# Patient Record
Sex: MALE | Race: WHITE | Employment: FULL TIME | URBAN - METROPOLITAN AREA
[De-identification: names, ages, dates, MRNs, and addresses within clinical notes are randomized per-mention and may not be internally consistent; named-entity substitution may affect disease eponyms.]

---

## 2019-05-13 ENCOUNTER — HOSPITAL ENCOUNTER (INPATIENT)
Age: 49
LOS: 1 days | Discharge: HOME | DRG: 638 | End: 2019-05-14
Attending: EMERGENCY MEDICINE | Admitting: INTERNAL MEDICINE
Payer: COMMERCIAL

## 2019-05-13 ENCOUNTER — APPOINTMENT (OUTPATIENT)
Dept: GENERAL RADIOLOGY | Age: 49
DRG: 638 | End: 2019-05-13
Payer: COMMERCIAL

## 2019-05-13 ENCOUNTER — APPOINTMENT (OUTPATIENT)
Dept: CT IMAGING | Age: 49
DRG: 638 | End: 2019-05-13
Payer: COMMERCIAL

## 2019-05-13 DIAGNOSIS — R07.9 CHEST PAIN, UNSPECIFIED TYPE: ICD-10-CM

## 2019-05-13 DIAGNOSIS — E11.10 DIABETIC KETOACIDOSIS WITHOUT COMA ASSOCIATED WITH TYPE 2 DIABETES MELLITUS (HCC): Primary | ICD-10-CM

## 2019-05-13 DIAGNOSIS — R11.2 NON-INTRACTABLE VOMITING WITH NAUSEA, UNSPECIFIED VOMITING TYPE: ICD-10-CM

## 2019-05-13 DIAGNOSIS — E87.5 HYPERKALEMIA: ICD-10-CM

## 2019-05-13 DIAGNOSIS — R74.8 ELEVATED LIPASE: ICD-10-CM

## 2019-05-13 DIAGNOSIS — N28.9 RENAL INSUFFICIENCY: ICD-10-CM

## 2019-05-13 PROBLEM — E10.10 DKA, TYPE 1, NOT AT GOAL (HCC): Status: ACTIVE | Noted: 2019-05-13

## 2019-05-13 LAB
ALBUMIN SERPL-MCNC: 4.6 GM/DL (ref 3.4–5)
ALP BLD-CCNC: 70 IU/L (ref 40–128)
ALT SERPL-CCNC: 28 U/L (ref 10–40)
ANION GAP SERPL CALCULATED.3IONS-SCNC: 19 MMOL/L (ref 4–16)
ANION GAP SERPL CALCULATED.3IONS-SCNC: 26 MMOL/L (ref 4–16)
ANION GAP SERPL CALCULATED.3IONS-SCNC: 37 MMOL/L (ref 4–16)
AST SERPL-CCNC: 13 IU/L (ref 15–37)
BASE EXCESS: ABNORMAL (ref 0–3.3)
BASOPHILS ABSOLUTE: 0.1 K/CU MM
BASOPHILS RELATIVE PERCENT: 0.5 % (ref 0–1)
BETA-HYDROXYBUTYRATE: >20.8 MG/DL (ref 0–3)
BILIRUB SERPL-MCNC: 0.4 MG/DL (ref 0–1)
BUN BLDV-MCNC: 26 MG/DL (ref 6–23)
BUN BLDV-MCNC: 27 MG/DL (ref 6–23)
BUN BLDV-MCNC: 29 MG/DL (ref 6–23)
CALCIUM SERPL-MCNC: 8.5 MG/DL (ref 8.3–10.6)
CALCIUM SERPL-MCNC: 8.6 MG/DL (ref 8.3–10.6)
CALCIUM SERPL-MCNC: 8.9 MG/DL (ref 8.3–10.6)
CHLORIDE BLD-SCNC: 102 MMOL/L (ref 99–110)
CHLORIDE BLD-SCNC: 90 MMOL/L (ref 99–110)
CHLORIDE BLD-SCNC: 99 MMOL/L (ref 99–110)
CHP ED QC CHECK: NORMAL
CO2: 11 MMOL/L (ref 21–32)
CO2: 15 MMOL/L (ref 21–32)
CO2: 4 MMOL/L (ref 21–32)
COMMENT: ABNORMAL
CREAT SERPL-MCNC: 1.3 MG/DL (ref 0.9–1.3)
CREAT SERPL-MCNC: 1.5 MG/DL (ref 0.9–1.3)
CREAT SERPL-MCNC: 1.6 MG/DL (ref 0.9–1.3)
DIFFERENTIAL TYPE: ABNORMAL
EOSINOPHILS ABSOLUTE: 0 K/CU MM
EOSINOPHILS RELATIVE PERCENT: 0 % (ref 0–3)
GFR AFRICAN AMERICAN: 56 ML/MIN/1.73M2
GFR AFRICAN AMERICAN: >60 ML/MIN/1.73M2
GFR AFRICAN AMERICAN: >60 ML/MIN/1.73M2
GFR NON-AFRICAN AMERICAN: 46 ML/MIN/1.73M2
GFR NON-AFRICAN AMERICAN: 50 ML/MIN/1.73M2
GFR NON-AFRICAN AMERICAN: 59 ML/MIN/1.73M2
GLUCOSE BLD-MCNC: 162 MG/DL (ref 70–99)
GLUCOSE BLD-MCNC: 218 MG/DL (ref 70–99)
GLUCOSE BLD-MCNC: 220 MG/DL (ref 70–99)
GLUCOSE BLD-MCNC: 234 MG/DL (ref 70–99)
GLUCOSE BLD-MCNC: 262 MG/DL (ref 70–99)
GLUCOSE BLD-MCNC: 296 MG/DL (ref 70–99)
GLUCOSE BLD-MCNC: 329 MG/DL (ref 70–99)
GLUCOSE BLD-MCNC: 350 MG/DL (ref 70–99)
GLUCOSE BLD-MCNC: 380 MG/DL (ref 70–99)
GLUCOSE BLD-MCNC: 471 MG/DL
GLUCOSE BLD-MCNC: 471 MG/DL (ref 70–99)
GLUCOSE BLD-MCNC: 521 MG/DL (ref 70–99)
GLUCOSE BLD-MCNC: 533 MG/DL (ref 70–99)
HCO3 VENOUS: 3.7 MMOL/L (ref 19–25)
HCT VFR BLD CALC: 51 % (ref 42–52)
HEMOGLOBIN: 15.5 GM/DL (ref 13.5–18)
IMMATURE NEUTROPHIL %: 1.1 % (ref 0–0.43)
LACTATE: 1.5 MMOL/L (ref 0.4–2)
LACTATE: ABNORMAL MMOL/L (ref 0.4–2)
LIPASE: 170 IU/L (ref 13–60)
LYMPHOCYTES ABSOLUTE: 1 K/CU MM
LYMPHOCYTES RELATIVE PERCENT: 7 % (ref 24–44)
MAGNESIUM: 2.4 MG/DL (ref 1.8–2.4)
MAGNESIUM: 2.5 MG/DL (ref 1.8–2.4)
MCH RBC QN AUTO: 29.9 PG (ref 27–31)
MCHC RBC AUTO-ENTMCNC: 30.4 % (ref 32–36)
MCV RBC AUTO: 98.5 FL (ref 78–100)
MONOCYTES ABSOLUTE: 0.9 K/CU MM
MONOCYTES RELATIVE PERCENT: 6.4 % (ref 0–4)
NUCLEATED RBC %: 0 %
O2 SAT, VEN: 94.6 % (ref 50–70)
PCO2, VEN: 17 MMHG (ref 38–52)
PDW BLD-RTO: 12.8 % (ref 11.7–14.9)
PH VENOUS: 6.95 (ref 7.32–7.42)
PHOSPHORUS: 1.6 MG/DL (ref 2.5–4.9)
PHOSPHORUS: 2.9 MG/DL (ref 2.5–4.9)
PLATELET # BLD: 268 K/CU MM (ref 140–440)
PMV BLD AUTO: 9.6 FL (ref 7.5–11.1)
PO2, VEN: 149 MMHG (ref 28–48)
POTASSIUM SERPL-SCNC: 4.2 MMOL/L (ref 3.5–5.1)
POTASSIUM SERPL-SCNC: 4.5 MMOL/L (ref 3.5–5.1)
POTASSIUM SERPL-SCNC: ABNORMAL MMOL/L (ref 3.5–5.1)
RBC # BLD: 5.18 M/CU MM (ref 4.6–6.2)
SEGMENTED NEUTROPHILS ABSOLUTE COUNT: 12.4 K/CU MM
SEGMENTED NEUTROPHILS RELATIVE PERCENT: 85 % (ref 36–66)
SODIUM BLD-SCNC: 131 MMOL/L (ref 135–145)
SODIUM BLD-SCNC: 136 MMOL/L (ref 135–145)
SODIUM BLD-SCNC: 136 MMOL/L (ref 135–145)
TOTAL IMMATURE NEUTOROPHIL: 0.16 K/CU MM
TOTAL NUCLEATED RBC: 0 K/CU MM
TOTAL PROTEIN: 7.4 GM/DL (ref 6.4–8.2)
TROPONIN T: <0.01 NG/ML
WBC # BLD: 14.6 K/CU MM (ref 4–10.5)

## 2019-05-13 PROCEDURE — 71045 X-RAY EXAM CHEST 1 VIEW: CPT

## 2019-05-13 PROCEDURE — 6360000002 HC RX W HCPCS: Performed by: EMERGENCY MEDICINE

## 2019-05-13 PROCEDURE — 96375 TX/PRO/DX INJ NEW DRUG ADDON: CPT

## 2019-05-13 PROCEDURE — 6370000000 HC RX 637 (ALT 250 FOR IP): Performed by: NURSE PRACTITIONER

## 2019-05-13 PROCEDURE — 36415 COLL VENOUS BLD VENIPUNCTURE: CPT

## 2019-05-13 PROCEDURE — 96366 THER/PROPH/DIAG IV INF ADDON: CPT

## 2019-05-13 PROCEDURE — 96365 THER/PROPH/DIAG IV INF INIT: CPT

## 2019-05-13 PROCEDURE — C9113 INJ PANTOPRAZOLE SODIUM, VIA: HCPCS | Performed by: EMERGENCY MEDICINE

## 2019-05-13 PROCEDURE — 2580000003 HC RX 258: Performed by: NURSE PRACTITIONER

## 2019-05-13 PROCEDURE — 6370000000 HC RX 637 (ALT 250 FOR IP): Performed by: INTERNAL MEDICINE

## 2019-05-13 PROCEDURE — 6370000000 HC RX 637 (ALT 250 FOR IP): Performed by: EMERGENCY MEDICINE

## 2019-05-13 PROCEDURE — 93005 ELECTROCARDIOGRAM TRACING: CPT | Performed by: EMERGENCY MEDICINE

## 2019-05-13 PROCEDURE — 82962 GLUCOSE BLOOD TEST: CPT

## 2019-05-13 PROCEDURE — 6360000004 HC RX CONTRAST MEDICATION: Performed by: EMERGENCY MEDICINE

## 2019-05-13 PROCEDURE — 96368 THER/DIAG CONCURRENT INF: CPT

## 2019-05-13 PROCEDURE — 80053 COMPREHEN METABOLIC PANEL: CPT

## 2019-05-13 PROCEDURE — 80048 BASIC METABOLIC PNL TOTAL CA: CPT

## 2019-05-13 PROCEDURE — 84100 ASSAY OF PHOSPHORUS: CPT

## 2019-05-13 PROCEDURE — 83605 ASSAY OF LACTIC ACID: CPT

## 2019-05-13 PROCEDURE — 2000000000 HC ICU R&B

## 2019-05-13 PROCEDURE — 84484 ASSAY OF TROPONIN QUANT: CPT

## 2019-05-13 PROCEDURE — 83690 ASSAY OF LIPASE: CPT

## 2019-05-13 PROCEDURE — 93010 ELECTROCARDIOGRAM REPORT: CPT | Performed by: INTERNAL MEDICINE

## 2019-05-13 PROCEDURE — 85025 COMPLETE CBC W/AUTO DIFF WBC: CPT

## 2019-05-13 PROCEDURE — 82805 BLOOD GASES W/O2 SATURATION: CPT

## 2019-05-13 PROCEDURE — 2500000003 HC RX 250 WO HCPCS: Performed by: NURSE PRACTITIONER

## 2019-05-13 PROCEDURE — 2580000003 HC RX 258: Performed by: EMERGENCY MEDICINE

## 2019-05-13 PROCEDURE — 83735 ASSAY OF MAGNESIUM: CPT

## 2019-05-13 PROCEDURE — 94761 N-INVAS EAR/PLS OXIMETRY MLT: CPT

## 2019-05-13 PROCEDURE — 74177 CT ABD & PELVIS W/CONTRAST: CPT

## 2019-05-13 PROCEDURE — 2500000003 HC RX 250 WO HCPCS: Performed by: EMERGENCY MEDICINE

## 2019-05-13 PROCEDURE — 99291 CRITICAL CARE FIRST HOUR: CPT

## 2019-05-13 PROCEDURE — 82010 KETONE BODYS QUAN: CPT

## 2019-05-13 RX ORDER — 0.9 % SODIUM CHLORIDE 0.9 %
1000 INTRAVENOUS SOLUTION INTRAVENOUS ONCE
Status: COMPLETED | OUTPATIENT
Start: 2019-05-13 | End: 2019-05-13

## 2019-05-13 RX ORDER — DEXTROSE MONOHYDRATE 25 G/50ML
12.5 INJECTION, SOLUTION INTRAVENOUS PRN
Status: DISCONTINUED | OUTPATIENT
Start: 2019-05-13 | End: 2019-05-14 | Stop reason: HOSPADM

## 2019-05-13 RX ORDER — POTASSIUM CHLORIDE 7.45 MG/ML
10 INJECTION INTRAVENOUS PRN
Status: DISCONTINUED | OUTPATIENT
Start: 2019-05-13 | End: 2019-05-14 | Stop reason: HOSPADM

## 2019-05-13 RX ORDER — SODIUM CHLORIDE 450 MG/100ML
INJECTION, SOLUTION INTRAVENOUS CONTINUOUS
Status: DISCONTINUED | OUTPATIENT
Start: 2019-05-13 | End: 2019-05-14

## 2019-05-13 RX ORDER — DEXTROSE MONOHYDRATE 50 MG/ML
100 INJECTION, SOLUTION INTRAVENOUS PRN
Status: DISCONTINUED | OUTPATIENT
Start: 2019-05-13 | End: 2019-05-14 | Stop reason: HOSPADM

## 2019-05-13 RX ORDER — INSULIN GLARGINE 100 [IU]/ML
40 INJECTION, SOLUTION SUBCUTANEOUS NIGHTLY
Status: DISCONTINUED | OUTPATIENT
Start: 2019-05-13 | End: 2019-05-14 | Stop reason: HOSPADM

## 2019-05-13 RX ORDER — 0.9 % SODIUM CHLORIDE 0.9 %
10 VIAL (ML) INJECTION
Status: DISCONTINUED | OUTPATIENT
Start: 2019-05-13 | End: 2019-05-14 | Stop reason: HOSPADM

## 2019-05-13 RX ORDER — LISINOPRIL 5 MG/1
5 TABLET ORAL DAILY
COMMUNITY

## 2019-05-13 RX ORDER — NICOTINE POLACRILEX 4 MG
15 LOZENGE BUCCAL PRN
Status: DISCONTINUED | OUTPATIENT
Start: 2019-05-13 | End: 2019-05-14 | Stop reason: HOSPADM

## 2019-05-13 RX ORDER — PANTOPRAZOLE SODIUM 40 MG/10ML
40 INJECTION, POWDER, LYOPHILIZED, FOR SOLUTION INTRAVENOUS ONCE
Status: COMPLETED | OUTPATIENT
Start: 2019-05-13 | End: 2019-05-13

## 2019-05-13 RX ORDER — SIMVASTATIN 20 MG
20 TABLET ORAL NIGHTLY
Status: DISCONTINUED | OUTPATIENT
Start: 2019-05-13 | End: 2019-05-14 | Stop reason: HOSPADM

## 2019-05-13 RX ORDER — MAGNESIUM SULFATE 1 G/100ML
1 INJECTION INTRAVENOUS PRN
Status: DISCONTINUED | OUTPATIENT
Start: 2019-05-13 | End: 2019-05-14 | Stop reason: HOSPADM

## 2019-05-13 RX ORDER — ONDANSETRON 2 MG/ML
4 INJECTION INTRAMUSCULAR; INTRAVENOUS EVERY 30 MIN PRN
Status: DISCONTINUED | OUTPATIENT
Start: 2019-05-13 | End: 2019-05-14 | Stop reason: HOSPADM

## 2019-05-13 RX ORDER — SIMVASTATIN 20 MG
20 TABLET ORAL NIGHTLY
COMMUNITY

## 2019-05-13 RX ORDER — ACETAMINOPHEN 325 MG/1
650 TABLET ORAL EVERY 4 HOURS PRN
Status: DISCONTINUED | OUTPATIENT
Start: 2019-05-13 | End: 2019-05-14 | Stop reason: HOSPADM

## 2019-05-13 RX ORDER — DEXTROSE AND SODIUM CHLORIDE 5; .45 G/100ML; G/100ML
INJECTION, SOLUTION INTRAVENOUS CONTINUOUS PRN
Status: DISCONTINUED | OUTPATIENT
Start: 2019-05-13 | End: 2019-05-14 | Stop reason: HOSPADM

## 2019-05-13 RX ADMIN — IOPAMIDOL 75 ML: 755 INJECTION, SOLUTION INTRAVENOUS at 12:26

## 2019-05-13 RX ADMIN — Medication 100 MEQ: at 11:12

## 2019-05-13 RX ADMIN — SODIUM CHLORIDE 1000 ML: 9 INJECTION, SOLUTION INTRAVENOUS at 11:08

## 2019-05-13 RX ADMIN — CALCIUM GLUCONATE 1 G: 98 INJECTION, SOLUTION INTRAVENOUS at 13:02

## 2019-05-13 RX ADMIN — ONDANSETRON 4 MG: 2 INJECTION INTRAMUSCULAR; INTRAVENOUS at 11:13

## 2019-05-13 RX ADMIN — SIMVASTATIN 20 MG: 20 TABLET, FILM COATED ORAL at 20:43

## 2019-05-13 RX ADMIN — PANTOPRAZOLE SODIUM 40 MG: 40 INJECTION, POWDER, FOR SOLUTION INTRAVENOUS at 11:13

## 2019-05-13 RX ADMIN — SODIUM CHLORIDE 1000 ML: 9 INJECTION, SOLUTION INTRAVENOUS at 11:09

## 2019-05-13 RX ADMIN — INSULIN LISPRO 10 UNITS: 100 INJECTION, SOLUTION INTRAVENOUS; SUBCUTANEOUS at 23:06

## 2019-05-13 RX ADMIN — SODIUM CHLORIDE 0.1 UNITS/KG/HR: 9 INJECTION, SOLUTION INTRAVENOUS at 12:58

## 2019-05-13 RX ADMIN — ACETAMINOPHEN 650 MG: 325 TABLET ORAL at 16:05

## 2019-05-13 RX ADMIN — INSULIN LISPRO 4 UNITS: 100 INJECTION, SOLUTION INTRAVENOUS; SUBCUTANEOUS at 23:03

## 2019-05-13 RX ADMIN — DEXTROSE AND SODIUM CHLORIDE: 5; 450 INJECTION, SOLUTION INTRAVENOUS at 20:37

## 2019-05-13 RX ADMIN — FAMOTIDINE 20 MG: 10 INJECTION, SOLUTION INTRAVENOUS at 20:43

## 2019-05-13 RX ADMIN — SODIUM CHLORIDE 5.2 UNITS/HR: 9 INJECTION, SOLUTION INTRAVENOUS at 16:05

## 2019-05-13 RX ADMIN — SODIUM CHLORIDE: 4.5 INJECTION, SOLUTION INTRAVENOUS at 16:05

## 2019-05-13 ASSESSMENT — PAIN SCALES - GENERAL
PAINLEVEL_OUTOF10: 9
PAINLEVEL_OUTOF10: 0
PAINLEVEL_OUTOF10: 0
PAINLEVEL_OUTOF10: 6
PAINLEVEL_OUTOF10: 9

## 2019-05-13 ASSESSMENT — PAIN DESCRIPTION - DIRECTION: RADIATING_TOWARDS: BACK

## 2019-05-13 ASSESSMENT — PAIN DESCRIPTION - PAIN TYPE
TYPE: ACUTE PAIN
TYPE: ACUTE PAIN

## 2019-05-13 ASSESSMENT — PAIN DESCRIPTION - LOCATION
LOCATION: HEAD
LOCATION: CHEST

## 2019-05-13 ASSESSMENT — PAIN DESCRIPTION - FREQUENCY: FREQUENCY: CONTINUOUS

## 2019-05-13 ASSESSMENT — PAIN DESCRIPTION - ORIENTATION: ORIENTATION: MID

## 2019-05-13 ASSESSMENT — PAIN DESCRIPTION - DESCRIPTORS
DESCRIPTORS: SHARP
DESCRIPTORS: HEADACHE

## 2019-05-13 NOTE — ED NOTES
[place],lactic acid and venous ph was drawn on patient  Second set of blood cultures were drawn on patient     Adilene ACOSTA  Myrtle  05/13/19 5850

## 2019-05-13 NOTE — FLOWSHEET NOTE
Pt arrived to ICU via cart per RN. Pt alert and oriented. No s/s distress noted. Skin check performed, see flowsheets. Insulin gtt infusing, see eMar. VSS. RN to continue to monitor closely.

## 2019-05-13 NOTE — PROGRESS NOTES
Medication History  [facility]    Patient Name: Savi Higgins 1970     Medication history has been completed by: Pro Witt CPhT    Source(s) of information: patient and insurance claims     Primary Care Physician: No primary care provider on file. Pharmacy: [place], [place]    Allergies as of 05/13/2019    (No Known Allergies)        Prior to Admission medications    Medication Sig Start Date End Date Taking? Authorizing Provider   lisinopril (PRINIVIL;ZESTRIL) 5 MG tablet Take 5 mg by mouth daily    Yes Historical Provider, MD   simvastatin (ZOCOR) 20 MG tablet Take 20 mg by mouth nightly    Yes Historical Provider, MD   INSULIN ASPART SC Inject into the skin Indications: insulin pump Use for insulin pump   Yes Historical Provider, MD   Insulin Pump - insulin aspart (patient supplied) Inject into the skin continuous Patient uses Omnipo Insulin Pump System   Yes Historical Provider, MD   Specialty Vitamins Products (TIANA-RX DIABETIC VITAMIN PO) Take by mouth daily   Yes Historical Provider, MD     Medications added or changed (ex.  new medication, dosage change, interval change, formulation change):  Diabetic vitamin pack daily (added)  Lisinopril dosage change from 10 mg to 5 mg daily     Other Comments:  Medication list reviewed with patient and insurance claims verified  Patient states he uses Omnipo insulin pump reports he uses Novolog     To my knowledge the above medication history is accurate as of 5/13/2019 1:47 PM.   Pro Witt CPhT   5/13/2019 1:47 PM

## 2019-05-13 NOTE — H&P
History and Physical      Name:  Ramón Ortega /Age/Sex: 1970  (51 y.o. male)   MRN & CSN:  [id number] & [id number] Admission Date/Time: 2019 10:11 AM   Location:  ED16/ED-16 PCP: No primary care provider on file. Hospital Day: 1    Discussed patient and POC with Dr. Kristen Arriaga and Plan:     Ramón Ortega is a 51 y.o.  male  who presents with abdominal pain, emesis, onset yesterday. - Abdominal pain, with emesis  Likely 2/2 below  CT ab/pelv without acute process  Emesis resolved at time of exam  Zofran    - DKA  Corrected Na is 137  Glucose 533, Beta Hydroxy > 20, Lactate 1.6, bicarb 4, AG 37  2 L NS bolus in ED  Insulin gtt  Maintenance IVF 0.45, then d5 45  Consult to Endo  BMP q 4 with Mg and phos  Will recheck lactic x 1  Patient received 2 amp bicarb in ED; no further bicarb at this time    - Hyperkalemia  5.8 on admission   Received Calcium gluconate in ED  Should correct with IVF; recheck    - ELIE  Creat 1.6  2/2 dehydration from above plus emesis  IVF as above  Recheck  Holding ACEi; holding nephro consult at this time    - HTN  Controlled  Holding ACEi overnight        Discussed patient with ED physician    Diet NPO   DVT Prophylaxis [x] Lovenox, []  Heparin, [] SCDs, [] Ambulation   GI Prophylaxis [] PPI,  [x] H2 Blocker,  [] Carafate,  [] Diet/Tube Feeds   Code Status Full   Disposition Patient requires continued admission due to DKA management   MDM [] Low, [] Moderate,[x]  High  Patient's risk as above due to      [x] One or more chronic illnesses with exacerbation progression      [x] Two or more stable chronic illnesses      [] Undiagnosed new problem with uncertain prognosis      [] Elective major surgery      []Prescription drug management     History of Present Illness:     Chief Complaint: abdominal pain, emesis    Ramón Ortega is a 51 y.o.  male  who presents from home with abdominal pain and emesis, onset yesterday.   Patient reports having DM I, for which he controls with an insulin pump. States he is from [place] and has been in [place] for a few days, taking care of his father and ran out of insulin. States he takes injectable insulin with him, but \"didn't think about it\" and thought he could control with diet. Reports having abdominal pain and heartburn and several bouts of emesis yesterday and this morning. Denies chest pain/pressure, sob, diarrhea. Has not had emesis in several hours. Abdominal pain 3/10, dull, diffuse, nonradiating. Ten point ROS reviewed negative, unless as noted above    Objective: Intake/Output Summary (Last 24 hours) at 5/13/2019 1448  Last data filed at 5/13/2019 1410  Gross per 24 hour   Intake 1100 ml   Output --   Net 1100 ml      Vitals:   Vitals:    05/13/19 1333   BP: (!) 147/76   Pulse: 119   Resp: 24   Temp: 98.3   SpO2: 100%     Physical Exam:   GEN Awake male, sitting upright in bed in no apparent distress. Appears given age. EYES Pupils are 3mm and PERRLA bilat. EOMs intact. No scleral erythema, discharge, or conjunctivitis. HENT Mucous membranes are moist. Oral pharynx without exudates, no evidence of thrush. NECK Supple, no apparent thyromegaly or masses. RESP Clear to auscultation, no wheezes, rales or rhonchi. Symmetric chest movement while on room air. CARDIO/VASC S1/S2 auscultated. Regular rate without appreciable murmurs, rubs, or gallops. No JVD or carotid bruits. Peripheral pulses equal bilaterally and palpable. No peripheral edema. GI Abdomen is soft without significant tenderness, masses, or guarding. Bowel sounds are normoactive. Rectal exam deferred.  No costovertebral angle tenderness. Collazo catheter is not present. HEME/LYMPH No palpable cervical lymphadenopathy and no hepatosplenomegaly. No petechiae or ecchymoses. MSK No gross joint deformities. 5/5 Strength bilaterally to delt/biceps/triceps/grasp/finger intrinsics/hip/knee/pf/df  SKIN Normal coloration, warm, dry.   NEURO Cranial nerves appear grossly intact, normal speech, no lateralizing weakness. Sensation intact and equal bilaterally  PSYCH Awake, alert, oriented x 4. Affect appropriate. Past Medical History:      Past Medical History:   Diagnosis Date    Diabetes mellitus ([facility] [address]     History of intestinal surgery 2015    Hyperlipidemia     Hypertension      PSHX:  has a past surgical history that includes Small intestine surgery (2015). Allergies: No Known Allergies    FAM HX: Mother, father alive. No known problems. Soc HX:   Social History     Socioeconomic History    Marital status:      Spouse name: None    Number of children: None    Years of education: None    Highest education level: None   Occupational History    None   Social Needs    Financial resource strain: None    Food insecurity:     Worry: None     Inability: None    Transportation needs:     Medical: None     Non-medical: None   Tobacco Use    Smoking status: Never Smoker    Smokeless tobacco: Never Used   Substance and Sexual Activity    Alcohol use:  Yes     Alcohol/week: 7.2 oz     Types: 12 Cans of beer per week    Drug use: Never    Sexual activity: None   Lifestyle    Physical activity:     Days per week: None     Minutes per session: None    Stress: None   Relationships    Social connections:     Talks on phone: None     Gets together: None     Attends Judaism service: None     Active member of club or organization: None     Attends meetings of clubs or organizations: None     Relationship status: None    Intimate partner violence:     Fear of current or ex partner: None     Emotionally abused: None     Physically abused: None     Forced sexual activity: None   Other Topics Concern    None   Social History Narrative    None       Medications:   Medications:      lisinopril (PRINIVIL;ZESTRIL) 5 MG tablet Take 5 mg by mouth daily  MICHAELA Rollins NP Not Ordered   simvastatin (ZOCOR) 20 MG tablet Take 20 mg by mouth colectomy. Pelvis: No pelvic masses or fluid collections are seen. Peritoneum/Retroperitoneum: The abdominal aorta is not aneurysmal.  No  retroperitoneal or mesenteric lymphadenopathy is seen. Bones/Soft Tissues: No acute bony abnormalities are noted. Shaheed Patel is a non  aggressive appearing lesion involving the left iliac wing.     Impression:       1. No acute intra-abdominal abnormality. 2. No acute intrapelvic abnormality. 3. Hepatic steatosis.     XR CHEST PORTABLE [[id number]] Collected: 05/13/19 1053     Order Status: Completed Updated: 05/13/19 1056     Narrative:       EXAMINATION:  ONE XRAY VIEW OF THE CHEST    5/13/2019 10:19 am    COMPARISON:  None. HISTORY:  ORDERING SYSTEM PROVIDED HISTORY: chest pain  TECHNOLOGIST PROVIDED HISTORY:  Reason for exam:->chest pain  Ordering Physician Provided Reason for Exam: chest pain   vomiting x2 days  Acuity: Acute  Type of Exam: Initial    FINDINGS:  Heart size and pulmonary vasculature within normal limits.  Lungs clear.   Costophrenic angles sharp     Impression:       No active cardiopulmonary disease           Electronically signed by MICHAELA Winters NP on 5/13/2019 at 2:48 PM

## 2019-05-13 NOTE — ED PROVIDER NOTES
3.5 - 5.1 MMOL/L     5.8  K & GLU CALLED TO DR Stacey Martinez ON 05/13/2019 @ 1049 BY JOANNE S   RESULTS READ BACK      Chloride 90 (L) 99 - 110 mMol/L    CO2 4 (L) 21 - 32 MMOL/L    BUN 26 (H) 6 - 23 MG/DL    CREATININE 1.6 (H) 0.9 - 1.3 MG/DL    Glucose 533 (HH) 70 - 99 MG/DL    Calcium 8.9 8.3 - 10.6 MG/DL    Alb 4.6 3.4 - 5.0 GM/DL    Total Protein 7.4 6.4 - 8.2 GM/DL    Total Bilirubin 0.4 0.0 - 1.0 MG/DL    ALT 28 10 - 40 U/L    AST 13 (L) 15 - 37 IU/L    Alkaline Phosphatase 70 40 - 128 IU/L    GFR Non- 46 (L) >60 mL/min/1.73m2    GFR  56 (L) >60 mL/min/1.73m2    Anion Gap 37 (H) 4 - 16   Troponin   Result Value Ref Range    Troponin T <0.010 <0.01 NG/ML   Lipase   Result Value Ref Range    Lipase 170 (H) 13 - 60 IU/L   Beta-Hydroxybutyrate   Result Value Ref Range    Beta-Hydroxybutyrate >20.8 (H) 0.0 - 3.0 MG/DL   Blood Gas, Venous   Result Value Ref Range    pH, Daniel 6.95 (L) 7.32 - 7.42    pCO2, Daniel 17 (L) 38 - 52 mmHG    pO2, Daniel 149 (H) 28 - 48 mmHG    Base Excess 27  MINUS   (H) 0 - 3.3    HCO3, Venous 3.7 (L) 19 - 25 MMOL/L    O2 Sat, Daniel 94.6 (H) 50 - 70 %    Comment VBG    Lactic Acid, Plasma   Result Value Ref Range    Lactate (HH) 0.4 - 2.0 mMOL/L     2.1  LACT CALLED TO DR Stacey Martinez ON 05/13/2019 @ 1049 BY JOANNE HEATH   RESULTS READ BACK     Lactic Acid, Plasma   Result Value Ref Range    Lactate 1.5 0.4 - 2.0 mMOL/L   POCT Glucose   Result Value Ref Range    POC Glucose 521 (H) 70 - 99 MG/DL   EKG 12 Lead   Result Value Ref Range    Ventricular Rate 98 BPM    Atrial Rate 98 BPM    P-R Interval 144 ms    QRS Duration 78 ms    Q-T Interval 346 ms    QTc Calculation (Bazett) 441 ms    P Axis 61 degrees    R Axis 15 degrees    T Axis 46 degrees    Diagnosis       Normal sinus rhythm  Normal ECG  No previous ECGs available  Confirmed by [facility] HE ([id number]) on 5/13/2019 10:57:25 AM          Radiographs:  [] Radiologist's Wet Read Report Reviewed:      CT ABDOMEN PELVIS W IV CONTRAST (Final result)   Result time 05/13/19 12:38:04   Final result by Alexander Doll MD (05/13/19 12:38:04)                Impression:    1. No acute intra-abdominal abnormality. 2. No acute intrapelvic abnormality. 3. Hepatic steatosis. Narrative:    EXAMINATION:  CT OF THE ABDOMEN AND PELVIS WITH CONTRAST 5/13/2019 12:24 pm    TECHNIQUE:  CT of the abdomen and pelvis was performed with the administration of  intravenous contrast. Multiplanar reformatted images are provided for review. Dose modulation, iterative reconstruction, and/or weight based adjustment of  the mA/kV was utilized to reduce the radiation dose to as low as reasonably  achievable. COMPARISON:  None. HISTORY:  ORDERING SYSTEM PROVIDED HISTORY: ABDOMINAL PAIN  TECHNOLOGIST PROVIDED HISTORY:  IV contrast only. Thank you. Ordering Physician Provided Reason for Exam: epigastric pain/ nausea x2 days  Acuity: Acute  Type of Exam: Initial  Additional signs and symptoms: 75ml isovue 370  Relevant Medical/Surgical History: hx small intestine surg/ twisted bowel    FINDINGS:  Lower Chest: The lung bases are clear. Organs: There is diffuse low-attenuation of the liver.  The spleen,  gallbladder, pancreas and adrenal glands appear unremarkable. Orelia Camron is  symmetric enhancement of the kidneys.  No hydronephrosis is seen. GI/Bowel: Evaluation of the bowel is limited as no enteric contrast was  given.  No dilated loops of bowel are seen. Orelia Camron are postsurgical changes  of partial right colectomy. Pelvis: No pelvic masses or fluid collections are seen. Peritoneum/Retroperitoneum: The abdominal aorta is not aneurysmal.  No  retroperitoneal or mesenteric lymphadenopathy is seen.     Bones/Soft Tissues: No acute bony abnormalities are noted. Orelia Camron is a non  aggressive appearing lesion involving the left iliac wing.                    XR CHEST PORTABLE (Final result)   Result time 05/13/19 10:53:53   Final result by Holland Hernandez Polo Luther MD (05/13/19 10:53:53)                Impression:    No active cardiopulmonary disease            Narrative:    EXAMINATION:  ONE XRAY VIEW OF THE CHEST    5/13/2019 10:19 am    COMPARISON:  None. HISTORY:  ORDERING SYSTEM PROVIDED HISTORY: chest pain  TECHNOLOGIST PROVIDED HISTORY:  Reason for exam:->chest pain  Ordering Physician Provided Reason for Exam: chest pain   vomiting x2 days  Acuity: Acute  Type of Exam: Initial    FINDINGS:  Heart size and pulmonary vasculature within normal limits.  Lungs clear. Costophrenic angles sharp                      [] Discussed with Radiologist:     [] The following radiograph was interpreted by myself in the absence of a radiologist:     EKG: (All EKG's are interpreted by myself in the absence of a cardiologist)  The Ekg interpreted by me shows  Normal sinus with rate of 98  Axis is   Normal  QTc is  normal  Intervals and Durations are unremarkable. ST Segments: peaked T waves  No old EKG       MDM:  Vitals show sinus tachycardia. Sats 100%. Afebrile. . Started on IV zofran, 2L fluids, protonix. Patient's white count 14.6 with a left shift. Lactic acid is 2.1. Beta hydroxybutyrate greater than 20.8. Venous blood gas the pH was 6.95. Electrolytes show a glucose of 533, creatinine of 1.6, potassium of 5.8, CO2 of 4, anion gap of 37. Patient started on an insulin drip given 2 A of bicarb IV as well as calcium gluconate IV. Troponin normal.  Lipase elevated at 170. CT abdomen does not show any signs of pancreatitis or any acute findings. Did discuss results with patient and need for ICU admission and further eval and treatment. He voices understanding and agrees to plan. CRITICAL CARE NOTE:  There was a high probability of clinically significant life-threatening deterioration of the patient's condition requiring my urgent intervention due to DKA, renal insufficiency, hyperkalemia.   IV fluids, IV insulin drip, IV bicarb, IV calcium, re-eval was performed to address this. Total critical care time is at least  35 minutes. This includes vital sign monitoring, pulse oximetry monitoring, telemetry monitoring, clinical response to the IV medications, reviewing the nursing notes, consultation time, dictation/documentation time, and interpretation of the lab work. This time excludes time spent performing procedures and separately billable procedures and family discussion time. Clinical Impression:  1. Diabetic ketoacidosis without coma associated with type 2 diabetes mellitus (HCC)    2. Chest pain, unspecified type    3. Non-intractable vomiting with nausea, unspecified vomiting type    4. Renal insufficiency    5. Hyperkalemia    6. Elevated lipase        Disposition Vitals:  [unfilled], [unfilled], [unfilled], [unfilled]    Disposition referral (if applicable):  No follow-up provider specified.     Disposition medications (if applicable):  New Prescriptions    No medications on file         (Please note that portions of this note may have been completed with a voice recognition program. Efforts were made to edit the dictations but occasionally words are mis-transcribed.)    MD Phuc Quevedo MD  05/13/19 145 Virginie Jenkins MD  05/13/19 130

## 2019-05-13 NOTE — ED NOTES
No change needed in the insuline gtt att this time BS decreased only [address], RN  05/13/19 0169

## 2019-05-13 NOTE — ED TRIAGE NOTES
Patient presents to the ED with complaints of chest pain that started 2 hours ago (0800). Patient has been vomiting for 2 days, believes to be related to diabetes and has been out of insulin for his insulin pump for 2 days. EMS gave , Nitro 0.4mg, PTA blood glucose 508.

## 2019-05-14 VITALS
WEIGHT: 230 LBS | HEIGHT: 72 IN | TEMPERATURE: 98.1 F | HEART RATE: 84 BPM | BODY MASS INDEX: 31.15 KG/M2 | RESPIRATION RATE: 20 BRPM | DIASTOLIC BLOOD PRESSURE: 84 MMHG | OXYGEN SATURATION: 100 % | SYSTOLIC BLOOD PRESSURE: 154 MMHG

## 2019-05-14 LAB
ANION GAP SERPL CALCULATED.3IONS-SCNC: 16 MMOL/L (ref 4–16)
BASOPHILS ABSOLUTE: 0 K/CU MM
BASOPHILS RELATIVE PERCENT: 0.3 % (ref 0–1)
BUN BLDV-MCNC: 21 MG/DL (ref 6–23)
CALCIUM SERPL-MCNC: 8.3 MG/DL (ref 8.3–10.6)
CHLORIDE BLD-SCNC: 106 MMOL/L (ref 99–110)
CO2: 15 MMOL/L (ref 21–32)
CREAT SERPL-MCNC: 1 MG/DL (ref 0.9–1.3)
DIFFERENTIAL TYPE: ABNORMAL
EOSINOPHILS ABSOLUTE: 0.1 K/CU MM
EOSINOPHILS RELATIVE PERCENT: 1.4 % (ref 0–3)
GFR AFRICAN AMERICAN: >60 ML/MIN/1.73M2
GFR NON-AFRICAN AMERICAN: >60 ML/MIN/1.73M2
GLUCOSE BLD-MCNC: 111 MG/DL (ref 70–99)
GLUCOSE BLD-MCNC: 150 MG/DL (ref 70–99)
GLUCOSE BLD-MCNC: 180 MG/DL (ref 70–99)
GLUCOSE BLD-MCNC: 209 MG/DL (ref 70–99)
HCT VFR BLD CALC: 39.2 % (ref 42–52)
HEMOGLOBIN: 13.1 GM/DL (ref 13.5–18)
IMMATURE NEUTROPHIL %: 0.8 % (ref 0–0.43)
LYMPHOCYTES ABSOLUTE: 1.2 K/CU MM
LYMPHOCYTES RELATIVE PERCENT: 13.4 % (ref 24–44)
MAGNESIUM: 2.2 MG/DL (ref 1.8–2.4)
MCH RBC QN AUTO: 30.3 PG (ref 27–31)
MCHC RBC AUTO-ENTMCNC: 33.4 % (ref 32–36)
MCV RBC AUTO: 90.5 FL (ref 78–100)
MONOCYTES ABSOLUTE: 1.1 K/CU MM
MONOCYTES RELATIVE PERCENT: 12.2 % (ref 0–4)
NUCLEATED RBC %: 0 %
PDW BLD-RTO: 12.9 % (ref 11.7–14.9)
PHOSPHORUS: 2.3 MG/DL (ref 2.5–4.9)
PLATELET # BLD: 183 K/CU MM (ref 140–440)
PMV BLD AUTO: 9.2 FL (ref 7.5–11.1)
POTASSIUM SERPL-SCNC: 4.1 MMOL/L (ref 3.5–5.1)
RBC # BLD: 4.33 M/CU MM (ref 4.6–6.2)
SEGMENTED NEUTROPHILS ABSOLUTE COUNT: 6.3 K/CU MM
SEGMENTED NEUTROPHILS RELATIVE PERCENT: 71.9 % (ref 36–66)
SODIUM BLD-SCNC: 137 MMOL/L (ref 135–145)
TOTAL IMMATURE NEUTOROPHIL: 0.07 K/CU MM
TOTAL NUCLEATED RBC: 0 K/CU MM
WBC # BLD: 8.7 K/CU MM (ref 4–10.5)

## 2019-05-14 PROCEDURE — 6370000000 HC RX 637 (ALT 250 FOR IP): Performed by: INTERNAL MEDICINE

## 2019-05-14 PROCEDURE — 85025 COMPLETE CBC W/AUTO DIFF WBC: CPT

## 2019-05-14 PROCEDURE — 84100 ASSAY OF PHOSPHORUS: CPT

## 2019-05-14 PROCEDURE — 83735 ASSAY OF MAGNESIUM: CPT

## 2019-05-14 PROCEDURE — 80048 BASIC METABOLIC PNL TOTAL CA: CPT

## 2019-05-14 PROCEDURE — 2500000003 HC RX 250 WO HCPCS: Performed by: NURSE PRACTITIONER

## 2019-05-14 PROCEDURE — 2580000003 HC RX 258: Performed by: INTERNAL MEDICINE

## 2019-05-14 RX ORDER — SODIUM CHLORIDE 9 MG/ML
INJECTION, SOLUTION INTRAVENOUS CONTINUOUS
Status: DISCONTINUED | OUTPATIENT
Start: 2019-05-14 | End: 2019-05-14 | Stop reason: HOSPADM

## 2019-05-14 RX ADMIN — FAMOTIDINE 20 MG: 10 INJECTION, SOLUTION INTRAVENOUS at 09:02

## 2019-05-14 RX ADMIN — SODIUM CHLORIDE: 9 INJECTION, SOLUTION INTRAVENOUS at 02:31

## 2019-05-14 RX ADMIN — INSULIN LISPRO 4 UNITS: 100 INJECTION, SOLUTION INTRAVENOUS; SUBCUTANEOUS at 08:57

## 2019-05-14 RX ADMIN — INSULIN GLARGINE 40 UNITS: 100 INJECTION, SOLUTION SUBCUTANEOUS at 00:49

## 2019-05-14 RX ADMIN — INSULIN LISPRO 15 UNITS: 100 INJECTION, SOLUTION INTRAVENOUS; SUBCUTANEOUS at 08:57

## 2019-05-14 ASSESSMENT — PAIN SCALES - GENERAL: PAINLEVEL_OUTOF10: 0

## 2019-05-14 NOTE — PROGRESS NOTES
Progress Note( Dr. Kely Euceda)  5/14/2019  Subjective:   Admit Date: 5/13/2019  PCP: No primary care provider on file. Admitted For : DKA and her blood glucose    Consulted For: Better control of blood glucose    Interval History: Feels much better now   Denies any chest pains,   Denies SOB . Denies nausea or vomiting. No new bowel or bladder symptoms. Intake/Output Summary (Last 24 hours) at 5/14/2019 0704  Last data filed at 5/14/2019 0619  Gross per 24 hour   Intake 3405.2 ml   Output --   Net 3405.2 ml       DATA    CBC:   Recent Labs     05/13/19  1010   WBC 14.6*   HGB 15.5       CMP:  Recent Labs     05/13/19  1010 05/13/19  1640 05/13/19  2015   * 136 136   K 5.8  K & GLU CALLED TO DR ISBELL ON 05/13/2019 @ 1040 BY JOANNE MLS   RESULTS READ BACK  * 4.5 4.2   CL 90* 99 102   CO2 4* 11* 15*   BUN 26* 29* 27*   CREATININE 1.6* 1.5* 1.3   CALCIUM 8.9 8.6 8.5   PROT 7.4  --   --    LABALBU 4.6  --   --    BILITOT 0.4  --   --    ALKPHOS 70  --   --    AST 13*  --   --    ALT 28  --   --      Lipids: No results found for: CHOL, HDL, TRIG  Glucose:  Recent Labs     05/13/19  2105 05/14/19  0056 05/14/19  0227   POCGLU 220* 150* 111*     GuardnesolF3D:No results found for: LABA1C  High Sensitivity TSH: No results found for: TSHHS  Free T3: No results found for: FT3  Free T4:No results found for: T4FREE    Ct Abdomen Pelvis W Iv Contrast    Result Date: 5/13/2019  EXAMINATION: CT OF THE ABDOMEN AND PELVIS WITH CONTRAST 5/13/2019 12:24 pm TECHNIQUE: CT of the abdomen and pelvis was performed with the administration of intravenous contrast. Multiplanar reformatted images are provided for review. Dose modulation, iterative reconstruction, and/or weight based adjustment of the mA/kV was utilized to reduce the radiation dose to as low as reasonably achievable. COMPARISON: None. HISTORY: ORDERING SYSTEM PROVIDED HISTORY: ABDOMINAL PAIN TECHNOLOGIST PROVIDED HISTORY: IV contrast only. Thank you. Ordering Physician Provided Reason for Exam: epigastric pain/ nausea x2 days Acuity: Acute Type of Exam: Initial Additional signs and symptoms: 75ml isovue 370 Relevant Medical/Surgical History: hx small intestine surg/ twisted bowel FINDINGS: Lower Chest: The lung bases are clear. Organs: There is diffuse low-attenuation of the liver. The spleen, gallbladder, pancreas and adrenal glands appear unremarkable. There is symmetric enhancement of the kidneys. No hydronephrosis is seen. GI/Bowel: Evaluation of the bowel is limited as no enteric contrast was given. No dilated loops of bowel are seen. There are postsurgical changes of partial right colectomy. Pelvis: No pelvic masses or fluid collections are seen. Peritoneum/Retroperitoneum: The abdominal aorta is not aneurysmal.  No retroperitoneal or mesenteric lymphadenopathy is seen. Bones/Soft Tissues: No acute bony abnormalities are noted. There is a non aggressive appearing lesion involving the left iliac wing. 1. No acute intra-abdominal abnormality. 2. No acute intrapelvic abnormality. 3. Hepatic steatosis. Xr Chest Portable    Result Date: 5/13/2019  EXAMINATION: ONE XRAY VIEW OF THE CHEST 5/13/2019 10:19 am COMPARISON: None. HISTORY: ORDERING SYSTEM PROVIDED HISTORY: chest pain TECHNOLOGIST PROVIDED HISTORY: Reason for exam:->chest pain Ordering Physician Provided Reason for Exam: chest pain   vomiting x2 days Acuity: Acute Type of Exam: Initial FINDINGS: Heart size and pulmonary vasculature within normal limits. Lungs clear.  Costophrenic angles sharp     No active cardiopulmonary disease       Scheduled Medicines   Medications:    enoxaparin  40 mg Subcutaneous Daily    simvastatin  20 mg Oral Nightly    famotidine (PEPCID) injection  20 mg Intravenous BID    insulin lispro  15 Units Subcutaneous TID     insulin glargine  40 Units Subcutaneous Nightly    insulin lispro  0-12 Units Subcutaneous TID     insulin lispro  0-6 Units Subcutaneous 2 times per day      Infusions:    sodium chloride 125 mL/hr at 05/14/19 0231    dextrose      dextrose 5 % and 0.45 % NaCl Stopped (05/14/19 0051)    insulin (HUMAN R) non-weight based infusion Stopped (05/14/19 0051)         Objective:   Vitals: BP (!) 108/54   Pulse 71   Temp 98.6 °F (37 °C) (Oral)   Resp 13   Ht 6' (1.829 m)   Wt 230 lb (104.3 kg)   SpO2 96%   BMI 31.19 kg/m²   General appearance: alert and cooperative with exam  Neck: no JVD or bruit  Thyroid : Normal lobes   Lungs: Has Vesicular Breath sounds   Heart:  regular rate and rhythm  Abdomen: soft, non-tender; bowel sounds normal; no masses,  no organomegaly  Musculoskeletal: Normal  Extremities: extremities normal, , no edema  Neurologic:  Awake, alert, oriented to name, place and time. Cranial nerves II-XII are grossly intact. Motor is  intact. Sensory is intact. ,  and gait is normal.    Assessment:     Patient Active Problem List:     DKA, type 1, not at goal [facility])      Plan:     1. Reviewed POC blood glucose . Labs and X ray results   2. Reviewed Current Medicines   3. D.C IV insulin Infusion   4. On meal/ Correction bolus Humalog/ Basal Lantus Insulin regime  5. Monitor Blood glucose frequently   6. Modified  the dose of Insulin/ other medicines as needed   7. Will follow   8. Okay to discharge later in the day    .      Jonathon Stratton MD

## 2019-05-14 NOTE — DISCHARGE SUMMARY
Thalia Hall 1970 [id number]  PCP:  No primary care provider on file. Admit date: 5/13/2019  Admitting Physician: Arlet Bowman MD    Discharge date: 5/14/2019 Discharge Physician: Arlet Bowman MD         Discharge Diagnoses. As per below    Hospital Course:  History of present illness at admission: As per H&P  Subsequent Hospital Course:      DKA improved  On  Insulin drip. Dehydration and acute kidney injury improved with fluids. Hyperkalemia improved on insulin drip. Follow-up BMP normal.    On the day of discharge, pt felt better. No new complaints. Pertinent Exam Findings on Day of Discharge:  General Appearance:    Alert, cooperative, no distress, appears stated age  Head:    Normocephalic, without obvious abnormality, atraumatic  Eyes:    PERRL, conjunctiva/corneas clear, EOM's intact  Lungs:    Clear to auscultation bilaterally, respirations unlabored   Heart:    Regular rate and rhythm, S1 and S2 normal, no murmur,   rub or gallop  Abdomen:     Soft, non-tender, bowel sounds active, no masses, no organomegaly  Extremities:   Extremities normal, atraumatic, no cyanosis or edema    Consults:  IP CONSULT TO HOSPITALIST  IP CONSULT TO ENDOCRINOLOGY    Patient Instructions:   Sofie Hernandez   [place] Medication Instructions [facility]:[id number]    Printed on:05/14/19 5784   Medication Information                      INSULIN ASPART SC  Inject into the skin Indications: insulin pump Use for insulin pump             Insulin Pump - insulin aspart (patient supplied)  Inject into the skin continuous Patient uses Omnipo Insulin Pump System             lisinopril (PRINIVIL;ZESTRIL) 5 MG tablet  Take 5 mg by mouth daily              simvastatin (ZOCOR) 20 MG tablet  Take 20 mg by mouth nightly              Specialty Vitamins Products (TIANA-RX DIABETIC VITAMIN PO)  Take by mouth daily                   Diet:  DIET CARB CONTROL;     Activity:   activity as tolerated     Discharge Condition: good    Disposition:   home    Follow-up     follow-up with PCP    Discharge Physician Signed: Joeline Carrel

## 2019-05-14 NOTE — CONSULTS
rhythm, normal S1 and S2, no S3 or S4, and has no  murmur   ABDOMEN:  No scars, normal bowel sounds, soft, non-distended, non-tender, no masses palpated, no hepatolienomegaly  Musculoskeletal: Normal  Extremities: Normal, peripheral pulses normal, , has no edema   NEUROLOGIC:  Awake, alert, oriented to name, place and time. Cranial nerves II-XII are grossly intact. Motor is  intact. Sensory is intact. ,  and gait is normal.    DATA:    CBC:   Recent Labs     05/13/19  1010   WBC 14.6*   HGB 15.5       CMP:  Recent Labs     05/13/19  1010 05/13/19  1640 05/13/19 2015   * 136 136   K 5.8  K & GLU CALLED TO DR ISBELL ON 05/13/2019 @ 1049 BY JOANNE HEATH   RESULTS READ BACK  * 4.5 4.2   CL 90* 99 102   CO2 4* 11* 15*   BUN 26* 29* 27*   CREATININE 1.6* 1.5* 1.3   CALCIUM 8.9 8.6 8.5   PROT 7.4  --   --    LABALBU 4.6  --   --    BILITOT 0.4  --   --    ALKPHOS 70  --   --    AST 13*  --   --    ALT 28  --   --      Lipids: No results found for: CHOL, HDL, TRIG  Glucose:   Recent Labs     05/13/19  1922 05/13/19 2012 05/13/19  2105   POCGLU 262* 162* 220*     Hemoglobin A1C: No results found for: LABA1C  Free T4: No results found for: T4FREE  Free T3: No results found for: FT3  TSH High Sensitivity: No results found for: TSHHS    Ct Abdomen Pelvis W Iv Contrast    Result Date: 5/13/2019  EXAMINATION: CT OF THE ABDOMEN AND PELVIS WITH CONTRAST 5/13/2019 12:24 pm       1. No acute intra-abdominal abnormality. 2. No acute intrapelvic abnormality. 3. Hepatic steatosis. Xr Chest Portable    Result Date: 5/13/2019  EXAMINATION: ONE XRAY VIEW OF THE CHEST 5/13/2019 10:19 am COMPARISON: None. HISTORY: ORDERING SYSTEM PROVIDED HISTORY: chest pain TECHNOLOGIST PROVIDED HISTORY: Reason for exam:->chest pain Ordering Physician Provided Reason for Exam: chest pain   vomiting x2 days Acuity: Acute Type of Exam: Initial FINDINGS: Heart size and pulmonary vasculature within normal limits. Lungs clear.  Costophrenic angles sharp     No active cardiopulmonary disease       Scheduled Medicines   Medications:    enoxaparin  40 mg Subcutaneous Daily    simvastatin  20 mg Oral Nightly    famotidine (PEPCID) injection  20 mg Intravenous BID    [START ON 5/14/2019] insulin lispro  15 Units Subcutaneous TID WC    insulin glargine  40 Units Subcutaneous Nightly    [START ON 5/14/2019] insulin lispro  0-12 Units Subcutaneous TID WC    insulin lispro  0-6 Units Subcutaneous 2 times per day    insulin lispro  10 Units Subcutaneous Once      Infusions:    dextrose      sodium chloride Stopped (05/13/19 2037)    dextrose 5 % and 0.45 % NaCl 150 mL/hr at 05/13/19 2037    insulin (HUMAN R) non-weight based infusion 6.64 Units/hr (05/13/19 2107)         IMPRESSION    Patient Active Problem List   Diagnosis    DKA, type 1, not at goal [facility])         RECOMMENDATIONS:      1. Reviewed POC blood glucose . Labs and X ray results   2. Reviewed Home and Current Medicines   3. Will continue on IV insulin infusion drip. For now. Once the next labs are back and it is an ion gap is around or less than 20. Discontinue insulin drip and place him on subcu insulin injections  4. Monitor Blood glucose and other electrolytes frequently   5. Modify  the dose of Insulin/   as needed        Will follow with you  Again thank you for sharing pt's care with me.      Truly yours,       Javier Li MD

## 2019-05-14 NOTE — CONSULTS
Reason for consult: DKA  Patient seen for diabetic educational needs. Dee Garcia is a 51y.o. year old male admitted with   Patient Active Problem List   Diagnosis    DKA, type 1, not at goal [facility])      HgBA1c:  No results found for: LABA1C      Patient reports that he did not have Omnipod device as he was traveling and was unable to get syringes for insulin injections. Verabalizes understanding of strategies for prevention of DKA and obtaining supplies while traveling. Mary Carver RN, BSN, CDE

## 2019-05-17 LAB
EKG ATRIAL RATE: 98 BPM
EKG DIAGNOSIS: NORMAL
EKG P AXIS: 61 DEGREES
EKG P-R INTERVAL: 144 MS
EKG Q-T INTERVAL: 346 MS
EKG QRS DURATION: 78 MS
EKG QTC CALCULATION (BAZETT): 441 MS
EKG R AXIS: 15 DEGREES
EKG T AXIS: 46 DEGREES
EKG VENTRICULAR RATE: 98 BPM